# Patient Record
Sex: FEMALE | Race: WHITE | NOT HISPANIC OR LATINO | Employment: STUDENT | URBAN - METROPOLITAN AREA
[De-identification: names, ages, dates, MRNs, and addresses within clinical notes are randomized per-mention and may not be internally consistent; named-entity substitution may affect disease eponyms.]

---
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
## 2021-01-05 ENCOUNTER — HOSPITAL ENCOUNTER (OUTPATIENT)
Dept: PHYSICAL THERAPY | Facility: HOSPITAL | Age: 21
Setting detail: THERAPIES SERIES
Discharge: HOME | End: 2021-01-05

## 2021-01-05 DIAGNOSIS — S42.92XD TRAUMATIC CLOSED DISPLACED FRACTURE OF LEFT SHOULDER WITH ANTERIOR DISLOCATION WITH ROUTINE HEALING, SUBSEQUENT ENCOUNTER: Primary | ICD-10-CM

## 2021-01-05 DIAGNOSIS — M25.512 LEFT SHOULDER PAIN, UNSPECIFIED CHRONICITY: ICD-10-CM

## 2021-01-05 DIAGNOSIS — M25.312 INSTABILITY OF LEFT SHOULDER JOINT: ICD-10-CM

## 2021-01-05 PROCEDURE — 97161 PT EVAL LOW COMPLEX 20 MIN: CPT

## 2021-01-05 NOTE — THERAPY EVALUATION
Outpatient Physical Therapy Ortho Initial Evaluation  [facility]     Patient Name: Angie Borrego  : 2000  MRN: [id number]  Today's Date: 2021      Visit Date: 2021    There is no problem list on file for this patient.       No past medical history on file.     No past surgical history on file.    Visit Dx:     ICD-10-CM ICD-9-CM   1. Traumatic closed displaced fracture of left shoulder with anterior dislocation with routine healing, subsequent encounter  S42.92XD V54.11   2. Left shoulder pain, unspecified chronicity  M25.512 719.41   3. Instability of left shoulder joint  M25.312 718.81         Patient History     Row Name 21 1034             History    Chief Complaint  Pain;Muscle weakness;Difficulty with daily activities;Tightness  -AC      Type of Pain  Shoulder pain  -      Date Current Problem(s) Began  20  -      Brief Description of Current Complaint  Pt presents with L shoulder pain after dislocation rolling over in bed. Denies having any other popping since initial injury. XR and MRI did not show labral tear but mild arthropathy/tendinopathy. After 2-3 weeks pain was pretty high with pressure, use of LUE. Denies having much issue with L shoulder now. Notes R shoulder injury similarly occurred while cheering, had PT and is now fully healed.  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 Works out occasionally (cardio-based activity).  -AC      Patient seeing anyone else for problem(s)?  Sascha  -AC      How has patient tried to help current problem?  Sling, Advil  -AC      What clinical tests have you had for this problem?  MRI;X-ray  -AC      Results of Clinical Tests  Mild supraspinatus tendinopathy, ACJ arthropathy; negative labral tear with min-no  evidence of Hill-Sachs deformity  -AC      History of Previous Related Injuries  R shoulder dislocation as a child, healed on its own  -AC         Pain     Pain Location  Shoulder  -AC      Pain at Present  0  -AC      Pain at Best  0  -AC      Pain at Worst  4  -AC      Pain Frequency  Rarely  -AC      Pain Description  Aching;Dull  -AC      What Performance Factors Make the Current Problem(s) WORSE?  Grabbing backpack with arm outstretch  -AC      What Performance Factors Make the Current Problem(s) BETTER?  Rest  -AC      Is your sleep disturbed?  No  -AC      Difficulties with ADL's?  Lifting heavier objects  -AC         Fall Risk Assessment    Any falls in the past year:  No  -AC         Daily Activities    Primary Language  English  -AC      Are you able to read  Yes  -AC      Are you able to write  Yes  -AC      How does patient learn best?  Listening;Demonstration;Reading  -AC      Teaching needs identified  Home Exercise Program  -AC      Patient is concerned about/has problems with  Difficulty with self care (i.e. bathing, dressing, toileting:;Performing home management (household chores, shopping, care of dependents)  -AC      Does patient have problems with the following?  None  -AC      Pt Participated in POC and Goals  Yes  -AC         Safety    Are you being hurt, hit, or frightened by anyone at home or in your life?  No  -AC      Are you being neglected by a caregiver  No  -AC      Have you had any of the following issues with  N/A  -AC        User Key  (r) = Recorded By, (t) = Taken By, (c) = Cosigned By    Initials Name Provider Type    AC Theresa Shaver, PT Physical Therapist          PT Ortho     Row Name 01/05/21 103       Posture/Observations    Alignment Options  Forward head;Rounded shoulders  -AC    Forward Head  Moderate;Increased  -AC    Rounded Shoulders  Bilateral:;Moderate;Increased  -AC       Quarter Clearing    Quarter Clearing  Upper Quarter Clearing  -AC       DTR- Upper  Quarter Clearing    Biceps (C5/6)  Bilateral:;2- Normal response  -AC    Brachioradialis (C6)  Bilateral:;2- Normal response  -AC    Triceps (C7)  Right:;1- Minimal response;Left:;2- Normal response  -AC       Myotomal Screen- Upper Quarter Clearing    Shoulder flexion (C5)  Bilateral:;4+ (Good +)  -AC    Elbow flexion/wrist extension (C6)  Right:;4+ (Good +);Left:;5 (Normal)  -AC    Elbow extension/wrist flexion (C7)  Bilateral:;5 (Normal)  -AC    Finger flexion/ (C8)  Bilateral:;4+ (Good +)  -AC    Finger abduction (T1)  Bilateral:;4 (Good)  -AC       Special Tests/Palpation    Special Tests/Palpation  Shoulder  -AC       Shoulder Girdle Accessory Motions    Shoulder Girdle Accessory Motions Tested?  Yes  -AC    Posterior glide of humerus  Left pain;Hypermobile  -AC    Inferior glide of humerus  Left:;Hypermobile  -AC    Lateral distraction  Left:;Hypermobile  -AC       Biceps/Labral Special Tests    Sarpy's Test (Labral Test)  Left:;Positive  -AC    Biceps I (Biceps Tendinopathy vs. Labral Tear)  Left:;Negative  -AC    Biceps II (Biceps Tendinopathy vs. Labral Tear)  Left:;Negative  -AC       General ROM    RT Upper Ext  Rt Shoulder Flexion;Rt Shoulder ABduction;Rt Shoulder External Rotation;Rt Shoulder Internal Rotation  -AC    LT Upper Ext  Lt Shoulder ABduction;Lt Shoulder Flexion;Lt Shoulder External Rotation;Lt Shoulder Internal Rotation  -AC       Right Upper Ext    Rt Shoulder Abduction AROM  180  -AC    Rt Shoulder Flexion AROM  170  -AC    Rt Shoulder External Rotation AROM  To scap spine  -AC    Rt Shoulder Internal Rotation AROM  To inferior border of scap  -AC       Left Upper Ext    Lt Shoulder Abduction AROM  180  -AC    Lt Shoulder Flexion AROM  167  -AC    Lt Shoulder External Rotation AROM  To scap spine  -AC    Lt Shoulder Internal Rotation AROM  To inferior border of scapula  -AC       MMT (Manual Muscle Testing)    Rt Upper Ext  Rt Shoulder ABduction;Rt Shoulder Internal Rotation;Rt  Shoulder External Rotation  -AC    Lt Upper Ext  Lt Shoulder ABduction;Lt Shoulder Internal Rotation;Lt Shoulder External Rotation  -AC       MMT Right Upper Ext    Rt Shoulder ABduction MMT, Gross Movement  (4+/5) good plus  -AC    Rt Shoulder Internal Rotation MMT, Gross Movement  (4+/5) good plus  -AC    Rt Shoulder External Rotation MMT, Gross Movement  (4+/5) good plus  -AC       MMT Left Upper Ext    Lt Shoulder ABduction MMT, Gross Movement  (4/5) good  -AC    Lt Shoulder Internal Rotation MMT, Gross Movement  (4/5) good  -AC    Lt Shoulder External Rotation MMT, Gross Movement  (4/5) good  -AC      User Key  (r) = Recorded By, (t) = Taken By, (c) = Cosigned By    Initials Name Provider Type    Theresa Deleon, PT Physical Therapist                      Therapy Education  Education Details: Pt provided HEP and red band including: wall push-ups, wall clocks, rows, ER/IR, and ADD. Encouraged pt to consider weight lifting program for long-term prevention of recurring symptoms.  Given: HEP, Symptoms/condition management  Program: New  How Provided: Verbal, Demonstration  Provided to: Patient  Level of Understanding: Verbalized     PT OP Goals     Row Name 01/05/21 1034          PT Short Term Goals    STG Date to Achieve  02/02/21  -AC     STG 1  Decrease L shoulder symptoms by 90% or better.  -AC     STG 1 Progress  New  -AC     STG 2  Enable ability to lift backpack and any heavier lifting without difficulty or instability noted.  -AC     STG 2 Progress  New  -AC     STG 3  Improve L shoulder strength to grossly 5/5.  -AC     STG 3 Progress  New  -AC        Time Calculation    PT Goal Re-Cert Due Date  04/05/21  -       User Key  (r) = Recorded By, (t) = Taken By, (c) = Cosigned By    Initials Name Provider Type    Theresa Deleon, PT Physical Therapist          PT Assessment/Plan     Row Name 01/05/21 1034          PT Assessment    Functional Limitations  Limitation in home  management;Performance in work activities  -AC     Impairments  Impaired muscle endurance;Impaired muscle power;Joint mobility;Muscle strength;Posture  -AC     Assessment Comments  Pt presents with improving symptoms of low complexity with signs consistent with L shoulder instability. Pt demonstrates global joint hypermobility and mild weakness/postural abnormalities. Pt notes pain in shoulder has improved with minimal impact on daily activities. Provided HEP and encouraged pt to perform them daily. Pt follows up with ortho tomorrow, and will follow up with PT within 30d as needed.  -AC     Please refer to paper survey for additional self-reported information  Yes  -AC     Rehab Potential  Good  -AC     Patient/caregiver participated in establishment of treatment plan and goals  Yes  -AC     Patient would benefit from skilled therapy intervention  Yes  -AC        PT Plan    PT Frequency  Other (comment)  -AC     Predicted Duration of Therapy Intervention (PT)  1-2 visits  -AC     Planned CPT's?  PT EVAL LOW COMPLEXITY: 91322;PT RE-EVAL: 70967;PT THER PROC EA 15 MIN: 45386;PT THER ACT EA 15 MIN: 52656;PT MANUAL THERAPY EA 15 MIN: 76104;PT NEUROMUSC RE-EDUCATION EA 15 MIN: 26587;PT HOT/COLD PACK WC NONMCARE: 37199;PT SELF CARE/HOME MGMT/TRAIN EA 15: 42885;PT THER SUPP EA 15 MIN;PT THER MASS EA 15 MIN: 96980;PT THER PROC GROUP: 15455  -AC     PT Plan Comments  Follow up within 30 days as needed.  -AC       User Key  (r) = Recorded By, (t) = Taken By, (c) = Cosigned By    Initials Name Provider Type    AC Theresa Shaver, PT Physical Therapist                              Outcome Measure Options: Quick DASH  Quick DASH  Open a tight or new jar.: Mild Difficulty  Do heavy household chores (e.g., wash walls, wash floors): Mild Difficulty  Carry a shopping bag or briefcase: No Difficulty  Wash your back: Mild Difficulty  Use a knife to cut food: No Difficulty  Recreational activities in which you take some force or  impact through your arm, should or hand (e.g. golf, hammering, tennis, etc.): Mild Difficulty  During the past week, to what extent has your arm, shoulder, or hand problem interfered with your normal social activites with family, friends, neighbors or groups?: Slightly  During the past week, were you limited in your work or other regular daily activities as a result of your arm, shoulder or hand problem?: Not limited at all  Arm, Shoulder, or hand pain: Moderate  Tingling (pins and needles) in your arm, shoulder, or hand: None  During the past week, how much difficulty have you had sleeping because of the pain in your arm, shoulder or hand?: No difficulty  Number of Questions Answered: 11  Quick DASH Score: 15.91         Time Calculation:     Start Time: 1034     Therapy Charges for Today     Code Description Service Date Service Provider Modifiers Qty    35226772979 HC PT EVAL LOW COMPLEXITY 4 1/5/2021 Theresa Shaver, PT GP 1          PT G-Codes  Outcome Measure Options: Quick DASH  Quick DASH Score: 15.91         Theresa Shaver, PT  1/5/2021

## 2021-01-06 ENCOUNTER — OFFICE VISIT (OUTPATIENT)
Dept: ORTHOPEDIC SURGERY | Facility: CLINIC | Age: 21
End: 2021-01-06

## 2021-01-06 VITALS — HEART RATE: 96 BPM | HEIGHT: 65 IN | OXYGEN SATURATION: 98 % | WEIGHT: 139.99 LBS | BODY MASS INDEX: 23.32 KG/M2

## 2021-01-06 DIAGNOSIS — S43.005S SHOULDER DISLOCATION, LEFT, SEQUELA: Primary | ICD-10-CM

## 2021-01-06 PROCEDURE — 99212 OFFICE O/P EST SF 10 MIN: CPT | Performed by: ORTHOPAEDIC SURGERY

## 2021-01-06 NOTE — PROGRESS NOTES
[facility] [place] [facility] Note    Subjective     Chief Complaint   Patient presents with   • Follow-up     4 weeks- Traumatic left shoulder anterior dislocation        HPI  Angie Borrego is a 20 y.o. female.  She is doing great.  Physical therapy released her.  Previous MRI showed no tears and only Hill-Sachs lesion.    History reviewed. No pertinent past medical history.   No past surgical history on file.   Family History   Problem Relation Age of Onset   • No Known Problems Mother    • No Known Problems Father      Social History     Socioeconomic History   • Marital status: Single     Spouse name: Not on file   • Number of children: Not on file   • Years of education: Not on file   • Highest education level: Not on file   Tobacco Use   • Smoking status: Never Smoker   • Smokeless tobacco: Never Used   Substance and Sexual Activity   • Alcohol use: Never     Frequency: Never   • Drug use: Never   • Sexual activity: Defer      Current Outpatient Medications on File Prior to Visit   Medication Sig Dispense Refill   • drospirenone-ethinyl estradiol (CHAZ,GIANVI) 3-0.02 MG per tablet Take 1 tablet by mouth Daily. 28 tablet 0   • ibuprofen (ADVIL,MOTRIN) 600 MG tablet Take 1 tablet by mouth Every 6 (Six) Hours As Needed for pain 60 tablet 0     No current facility-administered medications on file prior to visit.       No Known Allergies     The following portions of the patient's history were reviewed and updated as appropriate: allergies, current medications, past family history, past medical history, past social history, past surgical history and problem list.    Review of Systems   Constitutional: Negative.    HENT: Negative.    Eyes: Negative.    Respiratory: Negative.    Cardiovascular: Negative.    Gastrointestinal: Negative.    Endocrine: Negative.    Genitourinary: Negative.    Musculoskeletal: Positive for arthralgias.   Skin: Negative.    Allergic/Immunologic: Negative.    Neurological: Negative.   "  Hematological: Negative.    Psychiatric/Behavioral: Negative.         Objective      Physical Exam  Pulse 96   Ht 165.1 cm (65\")   Wt 63.5 kg (139 lb 15.9 oz)   LMP 12/07/2020 Comment: pt currently on cycle  SpO2 98%   BMI 23.30 kg/m²     Body mass index is 23.3 kg/m².    GENERAL APPEARANCE: awake, alert & oriented x 3, in no acute distress and well developed, well nourished  PSYCH: normal mood and affect  Left shoulder has full motion.  Full strength.  No deficits on exam.  Negative apprehension.    Imaging/Studies  Imaging Results (Last 7 Days)     ** No results found for the last 168 hours. **          Assessment/Plan        ICD-10-CM ICD-9-CM   1. Shoulder dislocation, left, sequela  S43.005S 905.6     She is doing great.  No complaints of instability or problems.  She will follow-up as needed.  Medical Decision Making  Management Options : over-the-counter medicine    Chano Caicedo MD  01/06/21  09:21 EST         EMR Dragon/Transcription disclaimer:  Much of this encounter note is an electronic transcription of spoken language to printed text. Electronic transcription of spoken language may permit erroneous, or at times, nonsensical words or phrases to be inadvertently transcribed. Although I have reviewed the note for such errors, some may still exist.      "

## 2021-05-17 ENCOUNTER — DOCUMENTATION (OUTPATIENT)
Dept: PHYSICAL THERAPY | Facility: HOSPITAL | Age: 21
End: 2021-05-17

## 2021-05-17 DIAGNOSIS — S42.92XD TRAUMATIC CLOSED DISPLACED FRACTURE OF LEFT SHOULDER WITH ANTERIOR DISLOCATION WITH ROUTINE HEALING, SUBSEQUENT ENCOUNTER: Primary | ICD-10-CM

## 2021-05-17 DIAGNOSIS — M25.512 LEFT SHOULDER PAIN, UNSPECIFIED CHRONICITY: ICD-10-CM

## 2021-05-17 DIAGNOSIS — M25.312 INSTABILITY OF LEFT SHOULDER JOINT: ICD-10-CM

## 2021-05-17 NOTE — THERAPY DISCHARGE NOTE
Outpatient Physical Therapy Discharge Summary         Patient Name: Angie Borrego  : 2000  MRN: [id number]    Today's Date: 2021    Visit Dx:    ICD-10-CM ICD-9-CM   1. Traumatic closed displaced fracture of left shoulder with anterior dislocation with routine healing, subsequent encounter  S42.92XD V54.11   2. Left shoulder pain, unspecified chronicity  M25.512 719.41   3. Instability of left shoulder joint  M25.312 718.81       PT OP Goals     Row Name 21 1128          PT Short Term Goals    STG Date to Achieve  21  -AC     STG 1  Decrease L shoulder symptoms by 90% or better.  -AC     STG 1 Progress  Met  -AC     STG 2  Enable ability to lift backpack and any heavier lifting without difficulty or instability noted.  -AC     STG 2 Progress  Met  -AC     STG 3  Improve L shoulder strength to grossly 5/5.  -AC     STG 3 Progress  Not Met  -AC        Time Calculation    PT Goal Re-Cert Due Date  21  -AC       User Key  (r) = Recorded By, (t) = Taken By, (c) = Cosigned By    Initials Name Provider Type    AC Theresa Shaver, PT Physical Therapist          OP PT Discharge Summary  Date of Discharge: 21  Reason for Discharge: Maximum functional potential achieved  Outcomes Achieved: Patient able to partially acheive established goals  Discharge Destination: Home with home program, Home without follow-up  Discharge Instructions/Additional Comments: Pt seen for IE only with minimal symptoms upon presentation. Pt did not return within 30d window for further care. D/c at this time.      Time Calculation:   Start Time:                 Theresa Shaver PT  2021

## 2021-09-20 PROCEDURE — U0004 COV-19 TEST NON-CDC HGH THRU: HCPCS | Performed by: EMERGENCY MEDICINE

## 2022-11-17 PROCEDURE — U0004 COV-19 TEST NON-CDC HGH THRU: HCPCS | Performed by: FAMILY MEDICINE

## 2024-08-15 ENCOUNTER — APPOINTMENT (OUTPATIENT)
Dept: URBAN - METROPOLITAN AREA CLINIC 306 | Age: 24
Setting detail: DERMATOLOGY
End: 2024-08-15

## 2024-08-15 DIAGNOSIS — L20.89 OTHER ATOPIC DERMATITIS: ICD-10-CM

## 2024-08-15 PROCEDURE — OTHER COUNSELING: OTHER

## 2024-08-15 PROCEDURE — 99203 OFFICE O/P NEW LOW 30 MIN: CPT

## 2024-08-15 PROCEDURE — OTHER PRESCRIPTION: OTHER

## 2024-08-15 RX ORDER — TRIAMCINOLONE ACETONIDE 1 MG/G
CREAM TOPICAL
Qty: 453.6 | Refills: 1 | Status: ERX | COMMUNITY
Start: 2024-08-15

## 2024-08-15 ASSESSMENT — LOCATION ZONE DERM: LOCATION ZONE: ARM

## 2024-08-15 ASSESSMENT — LOCATION SIMPLE DESCRIPTION DERM: LOCATION SIMPLE: RIGHT FOREARM

## 2024-08-15 ASSESSMENT — LOCATION DETAILED DESCRIPTION DERM: LOCATION DETAILED: RIGHT VENTRAL DISTAL FOREARM
